# Patient Record
Sex: FEMALE | Race: WHITE | Employment: FULL TIME | URBAN - METROPOLITAN AREA
[De-identification: names, ages, dates, MRNs, and addresses within clinical notes are randomized per-mention and may not be internally consistent; named-entity substitution may affect disease eponyms.]

---

## 2018-07-22 ENCOUNTER — HOSPITAL ENCOUNTER (EMERGENCY)
Age: 33
Discharge: HOME | End: 2018-07-22
Payer: COMMERCIAL

## 2018-07-22 VITALS
RESPIRATION RATE: 16 BRPM | DIASTOLIC BLOOD PRESSURE: 83 MMHG | WEIGHT: 115 LBS | OXYGEN SATURATION: 99 % | TEMPERATURE: 97.5 F | BODY MASS INDEX: 21.71 KG/M2 | HEART RATE: 62 BPM | HEIGHT: 61 IN | SYSTOLIC BLOOD PRESSURE: 125 MMHG

## 2018-07-22 DIAGNOSIS — K02.9 PAIN DUE TO DENTAL CARIES: Primary | ICD-10-CM

## 2018-07-22 PROCEDURE — 99282 EMERGENCY DEPT VISIT SF MDM: CPT

## 2018-07-22 RX ORDER — PENICILLIN V POTASSIUM 500 MG/1
500 TABLET ORAL 4 TIMES DAILY
Qty: 40 TABLET | Refills: 0 | Status: SHIPPED | OUTPATIENT
Start: 2018-07-22 | End: 2018-08-01

## 2018-07-22 RX ORDER — BUPIVACAINE HYDROCHLORIDE 2.5 MG/ML
30 INJECTION, SOLUTION EPIDURAL; INFILTRATION; INTRACAUDAL ONCE
Status: DISCONTINUED | OUTPATIENT
Start: 2018-07-22 | End: 2018-07-22 | Stop reason: HOSPADM

## 2018-07-22 RX ORDER — NAPROXEN 500 MG/1
500 TABLET ORAL 2 TIMES DAILY
Qty: 14 TABLET | Refills: 0 | Status: SHIPPED | OUTPATIENT
Start: 2018-07-22 | End: 2019-10-16

## 2018-07-22 ASSESSMENT — PAIN DESCRIPTION - PAIN TYPE: TYPE: ACUTE PAIN

## 2018-07-22 ASSESSMENT — PAIN DESCRIPTION - DESCRIPTORS: DESCRIPTORS: BURNING;THROBBING

## 2018-07-22 ASSESSMENT — PAIN DESCRIPTION - LOCATION: LOCATION: MOUTH

## 2018-07-22 ASSESSMENT — PAIN DESCRIPTION - FREQUENCY: FREQUENCY: CONTINUOUS

## 2018-07-22 ASSESSMENT — PAIN DESCRIPTION - PROGRESSION: CLINICAL_PROGRESSION: GRADUALLY WORSENING

## 2018-07-22 ASSESSMENT — PAIN DESCRIPTION - ONSET: ONSET: ON-GOING

## 2018-07-22 ASSESSMENT — PAIN DESCRIPTION - ORIENTATION: ORIENTATION: RIGHT

## 2018-07-22 ASSESSMENT — PAIN SCALES - GENERAL: PAINLEVEL_OUTOF10: 9

## 2018-07-22 NOTE — ED PROVIDER NOTES
[facility]       Department of Emergency Medicine   ED  Provider Note  Admit Date/RoomTime: 7/22/2018  7:10 PM  ED Room: 18/18   Chief Complaint   Dental Pain (Rt side; recent root canal; increased pain/swelling; denies drainage)    History of Present Illness   Source of history provided by:  patient. History/Exam Limitations: none. Ivonne Rich is a 29 y.o. old female who has a past medical history of: History reviewed. No pertinent past medical history. presents to the emergency department by private vehicle, for right upper dental pain, which occured 1 day(s) prior to arrival.  Since onset the symptoms have been gradually worsening and severe to profound in severity. Worsened by  heat, cold and chewing and improved by nothing. Associated Signs & Symptoms:  Facial pain/swelling. Onset:       Spontaneous:   no.     Following Trauma:   no.     Previous Caries:   yes. Recent Dental Procedure:   yes: root canal 3/2018. ROS    Pertinent positives and negatives are stated within HPI, all other systems reviewed and are negative. .    Past Surgical History:  has no past surgical history on file. Social History:  reports that she has been smoking. She has never used smokeless tobacco. She reports that she drinks alcohol. She reports that she does not use drugs. Family History: family history is not on file. Allergies: Sulfa antibiotics    Physical Exam           ED Triage Vitals [07/22/18 1917]   BP Temp Temp Source Pulse Resp SpO2 Height Weight   125/83 97.5 °F (36.4 °C) Oral 62 16 99 % 5' 1\" (1.549 m) 115 lb (52.2 kg)      Oxygen Saturation Interpretation: Normal.    · Constitutional:  Alert, development consistent with age. · HEENT:  NC/NT. Airway patent. · Neck:  Supple. Normal ROM. · Lips:  upper and lower normal.  · Mouth:  normal tongue and buccal mucosa. · Dental:  Numerous dental caries and tooth avulsions. Tooth #2 is absent.   Tooth #3 and 4 are avulsed, tooth #5 is

## 2019-10-16 ENCOUNTER — OFFICE VISIT (OUTPATIENT)
Dept: FAMILY MEDICINE CLINIC | Age: 34
End: 2019-10-16
Payer: COMMERCIAL

## 2019-10-16 VITALS
WEIGHT: 139.6 LBS | DIASTOLIC BLOOD PRESSURE: 60 MMHG | BODY MASS INDEX: 26.36 KG/M2 | TEMPERATURE: 97.8 F | OXYGEN SATURATION: 99 % | HEIGHT: 61 IN | HEART RATE: 66 BPM | RESPIRATION RATE: 18 BRPM | SYSTOLIC BLOOD PRESSURE: 110 MMHG

## 2019-10-16 DIAGNOSIS — Z76.89 ENCOUNTER TO ESTABLISH CARE: Primary | ICD-10-CM

## 2019-10-16 DIAGNOSIS — Z83.438 FH: HYPERLIPIDEMIA: ICD-10-CM

## 2019-10-16 DIAGNOSIS — Z82.49 FH: HEART DISEASE: ICD-10-CM

## 2019-10-16 DIAGNOSIS — R63.5 WEIGHT GAIN: ICD-10-CM

## 2019-10-16 DIAGNOSIS — Z23 NEEDS FLU SHOT: ICD-10-CM

## 2019-10-16 PROCEDURE — 99203 OFFICE O/P NEW LOW 30 MIN: CPT | Performed by: NURSE PRACTITIONER

## 2019-10-16 PROCEDURE — 90471 IMMUNIZATION ADMIN: CPT | Performed by: NURSE PRACTITIONER

## 2019-10-16 PROCEDURE — 90686 IIV4 VACC NO PRSV 0.5 ML IM: CPT | Performed by: NURSE PRACTITIONER

## 2019-10-16 RX ORDER — DESOGESTREL AND ETHINYL ESTRADIOL 0.15-0.03
KIT ORAL
COMMUNITY
Start: 2019-10-10 | End: 2021-01-07 | Stop reason: ALTCHOICE

## 2019-10-16 ASSESSMENT — ENCOUNTER SYMPTOMS
VOMITING: 0
TROUBLE SWALLOWING: 0
BACK PAIN: 1
COLOR CHANGE: 0
SINUS PAIN: 0
WHEEZING: 0
SHORTNESS OF BREATH: 0
DIARRHEA: 0
ABDOMINAL PAIN: 0
CHEST TIGHTNESS: 0
NAUSEA: 0
VOICE CHANGE: 0
FACIAL SWELLING: 0
CONSTIPATION: 0
SINUS PRESSURE: 0
RHINORRHEA: 0
SORE THROAT: 0
COUGH: 0

## 2019-10-16 ASSESSMENT — PATIENT HEALTH QUESTIONNAIRE - PHQ9
SUM OF ALL RESPONSES TO PHQ QUESTIONS 1-9: 0
2. FEELING DOWN, DEPRESSED OR HOPELESS: 0
1. LITTLE INTEREST OR PLEASURE IN DOING THINGS: 0
SUM OF ALL RESPONSES TO PHQ QUESTIONS 1-9: 0
SUM OF ALL RESPONSES TO PHQ9 QUESTIONS 1 & 2: 0

## 2019-11-08 ENCOUNTER — HOSPITAL ENCOUNTER (OUTPATIENT)
Age: 34
Discharge: HOME | End: 2019-11-10
Payer: COMMERCIAL

## 2019-11-08 DIAGNOSIS — Z82.49 FH: HEART DISEASE: ICD-10-CM

## 2019-11-08 DIAGNOSIS — Z76.89 ENCOUNTER TO ESTABLISH CARE: ICD-10-CM

## 2019-11-08 DIAGNOSIS — R63.5 WEIGHT GAIN: ICD-10-CM

## 2019-11-08 DIAGNOSIS — Z83.438 FH: HYPERLIPIDEMIA: ICD-10-CM

## 2019-11-08 LAB
ALBUMIN SERPL-MCNC: 4.7 G/DL (ref 3.5–5.2)
ALP BLD-CCNC: 70 U/L (ref 35–104)
ALT SERPL-CCNC: 13 U/L (ref 0–32)
ANION GAP SERPL CALCULATED.3IONS-SCNC: 14 MMOL/L (ref 7–16)
AST SERPL-CCNC: 19 U/L (ref 0–31)
BASOPHILS ABSOLUTE: 0.03 E9/L (ref 0–0.2)
BASOPHILS RELATIVE PERCENT: 0.5 % (ref 0–2)
BILIRUB SERPL-MCNC: 0.7 MG/DL (ref 0–1.2)
BUN BLDV-MCNC: 16 MG/DL (ref 6–20)
CALCIUM SERPL-MCNC: 9.4 MG/DL (ref 8.6–10.2)
CHLORIDE BLD-SCNC: 103 MMOL/L (ref 98–107)
CHOLESTEROL, TOTAL: 188 MG/DL (ref 0–199)
CO2: 22 MMOL/L (ref 22–29)
CREAT SERPL-MCNC: 0.8 MG/DL (ref 0.5–1)
EOSINOPHILS ABSOLUTE: 0.1 E9/L (ref 0.05–0.5)
EOSINOPHILS RELATIVE PERCENT: 1.7 % (ref 0–6)
GFR AFRICAN AMERICAN: >60
GFR NON-AFRICAN AMERICAN: >60 ML/MIN/1.73
GLUCOSE BLD-MCNC: 100 MG/DL (ref 74–99)
HCT VFR BLD CALC: 42.7 % (ref 34–48)
HDLC SERPL-MCNC: 66 MG/DL
HEMOGLOBIN: 13.7 G/DL (ref 11.5–15.5)
IMMATURE GRANULOCYTES #: 0.02 E9/L
IMMATURE GRANULOCYTES %: 0.3 % (ref 0–5)
LDL CHOLESTEROL CALCULATED: 113 MG/DL (ref 0–99)
LYMPHOCYTES ABSOLUTE: 1.78 E9/L (ref 1.5–4)
LYMPHOCYTES RELATIVE PERCENT: 29.5 % (ref 20–42)
MCH RBC QN AUTO: 29.9 PG (ref 26–35)
MCHC RBC AUTO-ENTMCNC: 32.1 % (ref 32–34.5)
MCV RBC AUTO: 93.2 FL (ref 80–99.9)
MONOCYTES ABSOLUTE: 0.4 E9/L (ref 0.1–0.95)
MONOCYTES RELATIVE PERCENT: 6.6 % (ref 2–12)
NEUTROPHILS ABSOLUTE: 3.7 E9/L (ref 1.8–7.3)
NEUTROPHILS RELATIVE PERCENT: 61.4 % (ref 43–80)
PDW BLD-RTO: 12.2 FL (ref 11.5–15)
PLATELET # BLD: 274 E9/L (ref 130–450)
PMV BLD AUTO: 9.9 FL (ref 7–12)
POTASSIUM SERPL-SCNC: 4.5 MMOL/L (ref 3.5–5)
RBC # BLD: 4.58 E12/L (ref 3.5–5.5)
SODIUM BLD-SCNC: 139 MMOL/L (ref 132–146)
T4 FREE: 1.48 NG/DL (ref 0.93–1.7)
TOTAL PROTEIN: 7.5 G/DL (ref 6.4–8.3)
TRIGL SERPL-MCNC: 45 MG/DL (ref 0–149)
TSH SERPL DL<=0.05 MIU/L-ACNC: 0.69 UIU/ML (ref 0.27–4.2)
VLDLC SERPL CALC-MCNC: 9 MG/DL
WBC # BLD: 6 E9/L (ref 4.5–11.5)

## 2019-11-08 PROCEDURE — 36415 COLL VENOUS BLD VENIPUNCTURE: CPT

## 2019-11-08 PROCEDURE — 85025 COMPLETE CBC W/AUTO DIFF WBC: CPT

## 2019-11-08 PROCEDURE — 84439 ASSAY OF FREE THYROXINE: CPT

## 2019-11-08 PROCEDURE — 80053 COMPREHEN METABOLIC PANEL: CPT

## 2019-11-08 PROCEDURE — 84443 ASSAY THYROID STIM HORMONE: CPT

## 2019-11-08 PROCEDURE — 80061 LIPID PANEL: CPT

## 2019-11-15 ENCOUNTER — OFFICE VISIT (OUTPATIENT)
Dept: FAMILY MEDICINE CLINIC | Age: 34
End: 2019-11-15
Payer: COMMERCIAL

## 2019-11-15 VITALS
HEIGHT: 61 IN | DIASTOLIC BLOOD PRESSURE: 72 MMHG | SYSTOLIC BLOOD PRESSURE: 110 MMHG | HEART RATE: 58 BPM | WEIGHT: 134.7 LBS | TEMPERATURE: 97.8 F | BODY MASS INDEX: 25.43 KG/M2 | RESPIRATION RATE: 18 BRPM | OXYGEN SATURATION: 99 %

## 2019-11-15 DIAGNOSIS — R63.5 WEIGHT GAIN: Primary | ICD-10-CM

## 2019-11-15 DIAGNOSIS — R73.01 IMPAIRED FASTING BLOOD SUGAR: ICD-10-CM

## 2019-11-15 LAB — HBA1C MFR BLD: 5 %

## 2019-11-15 PROCEDURE — 83036 HEMOGLOBIN GLYCOSYLATED A1C: CPT | Performed by: NURSE PRACTITIONER

## 2019-11-15 PROCEDURE — 99213 OFFICE O/P EST LOW 20 MIN: CPT | Performed by: NURSE PRACTITIONER

## 2019-11-15 ASSESSMENT — ENCOUNTER SYMPTOMS
COLOR CHANGE: 0
WHEEZING: 0
SHORTNESS OF BREATH: 0
COUGH: 0

## 2021-01-07 ENCOUNTER — HOSPITAL ENCOUNTER (EMERGENCY)
Age: 36
Discharge: HOME | End: 2021-01-07
Attending: EMERGENCY MEDICINE
Payer: COMMERCIAL

## 2021-01-07 ENCOUNTER — APPOINTMENT (OUTPATIENT)
Dept: GENERAL RADIOLOGY | Age: 36
End: 2021-01-07
Payer: COMMERCIAL

## 2021-01-07 VITALS
BODY MASS INDEX: 23 KG/M2 | OXYGEN SATURATION: 98 % | HEART RATE: 77 BPM | WEIGHT: 125 LBS | HEIGHT: 62 IN | TEMPERATURE: 98.3 F | RESPIRATION RATE: 16 BRPM | DIASTOLIC BLOOD PRESSURE: 67 MMHG | SYSTOLIC BLOOD PRESSURE: 130 MMHG

## 2021-01-07 DIAGNOSIS — S63.611A SPRAIN OF LEFT INDEX FINGER, INITIAL ENCOUNTER: Primary | ICD-10-CM

## 2021-01-07 PROCEDURE — 73130 X-RAY EXAM OF HAND: CPT

## 2021-01-07 PROCEDURE — 99283 EMERGENCY DEPT VISIT LOW MDM: CPT

## 2021-01-07 RX ORDER — NORGESTIMATE AND ETHINYL ESTRADIOL 7DAYSX3 LO
1 KIT ORAL NIGHTLY
COMMUNITY

## 2021-01-07 RX ORDER — IBUPROFEN 200 MG
200 TABLET ORAL EVERY 6 HOURS PRN
COMMUNITY

## 2021-01-07 ASSESSMENT — PAIN DESCRIPTION - LOCATION: LOCATION: FINGER (COMMENT WHICH ONE)

## 2021-01-07 ASSESSMENT — ENCOUNTER SYMPTOMS
COLOR CHANGE: 0
NAUSEA: 0
BLOOD IN STOOL: 0
SHORTNESS OF BREATH: 0
ABDOMINAL PAIN: 0
COUGH: 0
VOMITING: 0
BACK PAIN: 0
RHINORRHEA: 0

## 2021-01-07 ASSESSMENT — PAIN DESCRIPTION - PAIN TYPE: TYPE: ACUTE PAIN

## 2021-01-07 ASSESSMENT — PAIN DESCRIPTION - DESCRIPTORS: DESCRIPTORS: ACHING

## 2021-01-07 ASSESSMENT — PAIN DESCRIPTION - FREQUENCY: FREQUENCY: INTERMITTENT

## 2021-01-07 NOTE — ED PROVIDER NOTES
ED PROVIDER NOTE    Chief Complaint   Patient presents with    Finger Pain     left index finger, fall 2 weeks ago, thinks finger is broken       HPI:  1/7/21,   Time: 9:45 AM DONAVAN Saldana is a 37 y.o. female presenting to the ED for left index finger pain. Onset 12/21 after falling in the garage. No other pain elsewhere. Pain is been constant, throbbing, nonradiating, worse with movement at the PIP joint, no associated numbness or weakness. Has been using a finger splint and ibuprofen, however pain has not gotten any better and she needs to use her hands to work as a . Review of Systems:     Review of Systems   Constitutional: Negative for appetite change, chills and fever. HENT: Negative for congestion and rhinorrhea. Eyes: Negative for visual disturbance. Respiratory: Negative for cough and shortness of breath. Cardiovascular: Negative for chest pain. Gastrointestinal: Negative for abdominal pain, blood in stool, nausea and vomiting. Genitourinary: Negative for decreased urine volume and difficulty urinating. Musculoskeletal: Positive for arthralgias. Negative for back pain and neck pain. Skin: Negative for color change. Neurological: Negative for dizziness, syncope, weakness, light-headedness, numbness and headaches.         --------------------------------------------- PAST HISTORY ---------------------------------------------  Past Medical History:   Past Medical History:   Diagnosis Date    Chronic back pain     Headache        Past Surgical History:   History reviewed. No pertinent surgical history.     Social History:   Social History     Socioeconomic History    Marital status: Single     Spouse name: None    Number of children: None    Years of education: None    Highest education level: None   Occupational History    None   Social Needs    Financial resource strain: None    Food insecurity     Worry: None     Inability: None    Transportation needs     Medical: None     Non-medical: None   Tobacco Use    Smoking status: Former Smoker     Years: 13.00     Start date: 2003     Quit date: 10/16/2015     Years since quittin.2    Smokeless tobacco: Never Used    Tobacco comment: e-cigs   Substance and Sexual Activity    Alcohol use: Not Currently    Drug use: No    Sexual activity: Not Currently     Partners: Male     Birth control/protection: Pill   Lifestyle    Physical activity     Days per week: None     Minutes per session: None    Stress: None   Relationships    Social connections     Talks on phone: None     Gets together: None     Attends Christianity service: None     Active member of club or organization: None     Attends meetings of clubs or organizations: None     Relationship status: None    Intimate partner violence     Fear of current or ex partner: None     Emotionally abused: None     Physically abused: None     Forced sexual activity: None   Other Topics Concern    None   Social History Narrative    None       Family History:   Family History   Problem Relation Age of Onset    Cancer Mother         kidney cancer    High Blood Pressure Mother     High Cholesterol Mother     Cancer Father         bladder, skin, lung and brain    High Cholesterol Father     Heart Disease Maternal Grandmother     Emphysema Maternal Grandfather     Heart Disease Maternal Grandfather     Heart Disease Paternal Grandmother     Heart Disease Paternal Grandfather     No Known Problems Half-Sister     No Known Problems Half-Sister     No Known Problems Half-Brother        The patients home medications have been reviewed. Allergies:    Allergies   Allergen Reactions    Sulfa Antibiotics Rash           ---------------------------------------------------PHYSICAL EXAM--------------------------------------    /67   Pulse 77   Temp 98.3 °F (36.8 °C) (Oral)   Resp 16   Ht 5' 2\" (1.575 m)   Wt 125 lb (56.7 kg)   SpO2 98%   BMI 22.86 kg/m²     Physical Exam  Constitutional:       General: She is not in acute distress. Appearance: She is not toxic-appearing. HENT:      Mouth/Throat:      Mouth: Mucous membranes are moist.   Eyes:      General: No scleral icterus. Extraocular Movements: Extraocular movements intact. Neck:      Musculoskeletal: Normal range of motion and neck supple. Cardiovascular:      Rate and Rhythm: Normal rate and regular rhythm. Pulses: Normal pulses. Pulmonary:      Effort: Pulmonary effort is normal. No respiratory distress. Abdominal:      General: There is no distension. Palpations: Abdomen is soft. Tenderness: There is abdominal tenderness. Musculoskeletal:      Comments: L index PIP ttp, pain w/ ROM. MCP/DIP ROM intact and nontender. Good cap refill. SILT throughout   Skin:     General: Skin is warm and dry. Neurological:      Mental Status: She is alert and oriented to person, place, and time. Comments: Moves all extremities with appropriate strength. Sensation grossly intact in all extremities. -------------------------------------------------- RESULTS -------------------------------------------------  I have personally reviewed all laboratory and imaging results for this patient. Results are listed below. RADIOLOGY:  Interpreted personally and by Radiologist.  XR HAND LEFT (MIN 3 VIEWS)   Final Result   Soft tissue swelling at the index finger. No significant osseous abnormality.            ------------------------- NURSING NOTES AND VITALS REVIEWED ---------------------------   The nursing notes within the ED encounter and vital signs as below have been reviewed by myself. /67   Pulse 77   Temp 98.3 °F (36.8 °C) (Oral)   Resp 16   Ht 5' 2\" (1.575 m)   Wt 125 lb (56.7 kg)   SpO2 98%   BMI 22.86 kg/m²   Oxygen Saturation Interpretation: Normal    The patients available past medical records and past encounters were reviewed. ------------------------------ ED COURSE/MEDICAL DECISION MAKING----------------------  Medications - No data to display    Counseling: The emergency provider has spoken with the patient and discussed todays results, in addition to providing specific details for the plan of care and counseling regarding the diagnosis and prognosis. Questions are answered at this time and they are agreeable with the plan. ED Course/Medical Decision Makin y.o. female here with left index finger pain after injury more than 2 weeks ago. Finger is neurovascularly intact, extensor and flexor tendons intact, good cap refill. Plain films negative for acute fracture/dislocation. Updated patient with findings, she agrees with plan for discharge home. Referred to hand surgery given prolonged duration of symptoms without findings on plain films.       --------------------------------- IMPRESSION AND DISPOSITION ---------------------------------    IMPRESSION  1. Sprain of left index finger, initial encounter        DISPOSITION  Disposition: Discharge to home  Patient condition is good    NOTE: This report was transcribed using voice recognition software.  Every effort was made to ensure accuracy; however, inadvertent computerized transcription errors may be present    Anson Ward MD  Attending Emergency Physician          Anson Ward MD  [phone]

## 2022-01-09 ENCOUNTER — HOSPITAL ENCOUNTER (EMERGENCY)
Age: 37
Discharge: HOME | End: 2022-01-09
Payer: COMMERCIAL

## 2022-01-09 VITALS
TEMPERATURE: 98.1 F | OXYGEN SATURATION: 99 % | HEART RATE: 89 BPM | SYSTOLIC BLOOD PRESSURE: 111 MMHG | DIASTOLIC BLOOD PRESSURE: 76 MMHG | RESPIRATION RATE: 16 BRPM | BODY MASS INDEX: 21.03 KG/M2 | WEIGHT: 115 LBS

## 2022-01-09 DIAGNOSIS — N30.01 ACUTE CYSTITIS WITH HEMATURIA: Primary | ICD-10-CM

## 2022-01-09 LAB
BACTERIA: ABNORMAL /HPF
BILIRUBIN URINE: NEGATIVE
BLOOD, URINE: ABNORMAL
CLARITY: ABNORMAL
COLOR: YELLOW
EPITHELIAL CELLS, UA: ABNORMAL /HPF
GLUCOSE URINE: NEGATIVE MG/DL
HCG(URINE) PREGNANCY TEST: NEGATIVE
KETONES, URINE: NEGATIVE MG/DL
LEUKOCYTE ESTERASE, URINE: ABNORMAL
NITRITE, URINE: NEGATIVE
PH UA: 6 (ref 5–9)
PROTEIN UA: 30 MG/DL
RBC UA: >20 /HPF (ref 0–2)
SPECIFIC GRAVITY UA: 1.02 (ref 1–1.03)
UROBILINOGEN, URINE: 0.2 E.U./DL
WBC UA: ABNORMAL /HPF (ref 0–5)

## 2022-01-09 PROCEDURE — 87186 SC STD MICRODIL/AGAR DIL: CPT

## 2022-01-09 PROCEDURE — 81025 URINE PREGNANCY TEST: CPT

## 2022-01-09 PROCEDURE — 87077 CULTURE AEROBIC IDENTIFY: CPT

## 2022-01-09 PROCEDURE — 81001 URINALYSIS AUTO W/SCOPE: CPT

## 2022-01-09 PROCEDURE — 87088 URINE BACTERIA CULTURE: CPT

## 2022-01-09 PROCEDURE — 99211 OFF/OP EST MAY X REQ PHY/QHP: CPT

## 2022-01-09 RX ORDER — PHENAZOPYRIDINE HYDROCHLORIDE 200 MG/1
200 TABLET, FILM COATED ORAL 3 TIMES DAILY PRN
Qty: 6 TABLET | Refills: 0 | Status: SHIPPED | OUTPATIENT
Start: 2022-01-09 | End: 2022-01-11

## 2022-01-09 RX ORDER — CEFDINIR 300 MG/1
300 CAPSULE ORAL 2 TIMES DAILY
Qty: 14 CAPSULE | Refills: 0 | Status: SHIPPED | OUTPATIENT
Start: 2022-01-09 | End: 2022-01-16

## 2022-01-09 NOTE — ED PROVIDER NOTES
[id number] [facility]  Department of Emergency Medicine  UC Encounter Note  22   10:55 AM EST      NAME: Salvatore Solorzano  :  1985  MRN:  [id number]    Chief Complaint: Urinary Tract Infection (flank pain, frequency, not feeling like she is emptying, low back aches, fever)      This is a 68-year-old female the presents to urgent care complaining of urinary symptoms for the past couple days. She denies any fevers or chills. On first contact patient she appears to be in no acute distress. Review of Systems  Pertinent positives and negatives are stated within HPI, all other systems reviewed and are negative. Physical Exam  Vitals and nursing note reviewed. Constitutional:       Appearance: She is well-developed. HENT:      Head: Normocephalic and atraumatic. Right Ear: Hearing and external ear normal.      Left Ear: Hearing and external ear normal.      Nose: Nose normal.      Mouth/Throat:      Pharynx: Uvula midline. Eyes:      General: Lids are normal.      Conjunctiva/sclera: Conjunctivae normal.      Pupils: Pupils are equal, round, and reactive to light. Cardiovascular:      Rate and Rhythm: Normal rate and regular rhythm. Heart sounds: Normal heart sounds. No murmur heard. Pulmonary:      Effort: Pulmonary effort is normal.      Breath sounds: Normal breath sounds. Abdominal:      General: Bowel sounds are normal.      Palpations: Abdomen is soft. Abdomen is not rigid. Tenderness: There is no abdominal tenderness. There is no right CVA tenderness, left CVA tenderness, guarding or rebound. Musculoskeletal:      Cervical back: Normal range of motion and neck supple. Skin:     General: Skin is warm and dry. Findings: No abrasion or rash. Neurological:      Mental Status: She is alert and oriented to person, place, and time. GCS: GCS eye subscore is 4. GCS verbal subscore is 5. GCS motor subscore is 6. Cranial Nerves:  No cranial nerve deficit. Sensory: No sensory deficit. Coordination: Coordination normal.      Gait: Gait normal.         Procedures    [facility]         --------------------------------------------- PAST HISTORY ---------------------------------------------  Past Medical History:  has a past medical history of Chronic back pain and Headache. Past Surgical History:  has no past surgical history on file. Social History:  reports that she quit smoking about 6 years ago. She started smoking about 19 years ago. She quit after 13.00 years of use. She has never used smokeless tobacco. She reports previous alcohol use. She reports that she does not use drugs. Family History: family history includes Cancer in her father and mother; Emphysema in her maternal grandfather; Heart Disease in her maternal grandfather, maternal grandmother, paternal grandfather, and paternal grandmother; High Blood Pressure in her mother; High Cholesterol in her father and mother; No Known Problems in her half-brother, half-sister, and half-sister. The patients home medications have been reviewed.     Allergies: Sulfa antibiotics    -------------------------------------------------- RESULTS -------------------------------------------------  Results for orders placed or performed during the hospital encounter of 01/09/22   Urinalysis   Result Value Ref Range    Color, UA Yellow Straw/Yellow    Clarity, UA CLOUDY (A) Clear    Glucose, Ur Negative Negative mg/dL    Bilirubin Urine Negative Negative    Ketones, Urine Negative Negative mg/dL    Specific Gravity, UA 1.020 1.005 - 1.030    Blood, Urine MODERATE (A) Negative    pH, UA 6.0 5.0 - 9.0    Protein, UA 30 (A) Negative mg/dL    Urobilinogen, Urine 0.2 <2.0 E.U./dL    Nitrite, Urine Negative Negative    Leukocyte Esterase, Urine MODERATE (A) Negative   Pregnancy, Urine   Result Value Ref Range    HCG(Urine) Pregnancy Test NEGATIVE NEGATIVE   Microscopic Urinalysis   Result Value Ref Range    WBC, UA PACKED (A) 0 - 5 /HPF    RBC, UA >20 0 - 2 /HPF    Epithelial Cells, UA FEW /HPF    Bacteria, UA FEW (A) None Seen /HPF     No orders to display       ------------------------- NURSING NOTES AND VITALS REVIEWED ---------------------------   The nursing notes within the ED encounter and vital signs as below have been reviewed. /76   Pulse 89   Temp 98.1 °F (36.7 °C)   Resp 16   Wt 115 lb (52.2 kg)   LMP 12/27/2021   SpO2 99%   BMI 21.03 kg/m²   Oxygen Saturation Interpretation: Normal      ------------------------------------------ PROGRESS NOTES ------------------------------------------   I have spoken with the patient and discussed todays results, in addition to providing specific details for the plan of care and counseling regarding the diagnosis and prognosis. Their questions are answered at this time and they are agreeable with the plan.      --------------------------------- ADDITIONAL PROVIDER NOTES ---------------------------------     This patient is stable for discharge. I have shared the specific conditions for return, as well as the importance of follow-up. * NOTE: This report was transcribed using voice recognition software. Every effort was made to ensure accuracy; however, inadvertent computerized transcription errors may be present.    --------------------------------- IMPRESSION AND DISPOSITION ---------------------------------    IMPRESSION  1.  Acute cystitis with hematuria        DISPOSITION  Disposition: Discharge to home  Patient condition is good       Lopez Corrigan PA-C  01/09/22 [id number]

## 2022-01-10 ENCOUNTER — APPOINTMENT (OUTPATIENT)
Dept: CT IMAGING | Age: 37
End: 2022-01-10
Payer: COMMERCIAL

## 2022-01-10 ENCOUNTER — HOSPITAL ENCOUNTER (EMERGENCY)
Age: 37
Discharge: HOME | End: 2022-01-10
Attending: STUDENT IN AN ORGANIZED HEALTH CARE EDUCATION/TRAINING PROGRAM
Payer: COMMERCIAL

## 2022-01-10 ENCOUNTER — TELEPHONE (OUTPATIENT)
Dept: FAMILY MEDICINE CLINIC | Age: 37
End: 2022-01-10

## 2022-01-10 VITALS
HEART RATE: 84 BPM | RESPIRATION RATE: 16 BRPM | DIASTOLIC BLOOD PRESSURE: 63 MMHG | SYSTOLIC BLOOD PRESSURE: 139 MMHG | OXYGEN SATURATION: 98 % | TEMPERATURE: 98.1 F

## 2022-01-10 DIAGNOSIS — N10 ACUTE PYELONEPHRITIS: Primary | ICD-10-CM

## 2022-01-10 LAB
ANION GAP SERPL CALCULATED.3IONS-SCNC: 12 MMOL/L (ref 7–16)
BACTERIA: ABNORMAL /HPF
BASOPHILS ABSOLUTE: 0.04 E9/L (ref 0–0.2)
BASOPHILS RELATIVE PERCENT: 0.3 % (ref 0–2)
BILIRUBIN URINE: NEGATIVE
BLOOD, URINE: ABNORMAL
BUN BLDV-MCNC: 10 MG/DL (ref 6–20)
CALCIUM SERPL-MCNC: 9.2 MG/DL (ref 8.6–10.2)
CHLORIDE BLD-SCNC: 101 MMOL/L (ref 98–107)
CLARITY: ABNORMAL
CO2: 24 MMOL/L (ref 22–29)
COLOR: ABNORMAL
CREAT SERPL-MCNC: 0.7 MG/DL (ref 0.5–1)
EOSINOPHILS ABSOLUTE: 0.02 E9/L (ref 0.05–0.5)
EOSINOPHILS RELATIVE PERCENT: 0.1 % (ref 0–6)
EPITHELIAL CELLS, UA: ABNORMAL /HPF
GFR AFRICAN AMERICAN: >60
GFR NON-AFRICAN AMERICAN: >60 ML/MIN/1.73
GLUCOSE BLD-MCNC: 98 MG/DL (ref 74–99)
GLUCOSE URINE: 100 MG/DL
HCT VFR BLD CALC: 41.1 % (ref 34–48)
HEMOGLOBIN: 13.3 G/DL (ref 11.5–15.5)
IMMATURE GRANULOCYTES #: 0.07 E9/L
IMMATURE GRANULOCYTES %: 0.5 % (ref 0–5)
KETONES, URINE: NEGATIVE MG/DL
LACTIC ACID: 1.5 MMOL/L (ref 0.5–2.2)
LEUKOCYTE ESTERASE, URINE: ABNORMAL
LIPASE: 17 U/L (ref 13–60)
LYMPHOCYTES ABSOLUTE: 1.46 E9/L (ref 1.5–4)
LYMPHOCYTES RELATIVE PERCENT: 10.8 % (ref 20–42)
MCH RBC QN AUTO: 30.4 PG (ref 26–35)
MCHC RBC AUTO-ENTMCNC: 32.4 % (ref 32–34.5)
MCV RBC AUTO: 94.1 FL (ref 80–99.9)
MONOCYTES ABSOLUTE: 0.93 E9/L (ref 0.1–0.95)
MONOCYTES RELATIVE PERCENT: 6.9 % (ref 2–12)
NEUTROPHILS ABSOLUTE: 11.04 E9/L (ref 1.8–7.3)
NEUTROPHILS RELATIVE PERCENT: 81.4 % (ref 43–80)
NITRITE, URINE: POSITIVE
PDW BLD-RTO: 12 FL (ref 11.5–15)
PH UA: 5.5 (ref 5–9)
PLATELET # BLD: 249 E9/L (ref 130–450)
PMV BLD AUTO: 9.3 FL (ref 7–12)
POTASSIUM REFLEX MAGNESIUM: 4.5 MMOL/L (ref 3.5–5)
PROTEIN UA: 100 MG/DL
RBC # BLD: 4.37 E12/L (ref 3.5–5.5)
RBC UA: ABNORMAL /HPF (ref 0–2)
SODIUM BLD-SCNC: 137 MMOL/L (ref 132–146)
SPECIFIC GRAVITY UA: 1.02 (ref 1–1.03)
UROBILINOGEN, URINE: 4 E.U./DL
WBC # BLD: 13.6 E9/L (ref 4.5–11.5)
WBC UA: ABNORMAL /HPF (ref 0–5)

## 2022-01-10 PROCEDURE — 81001 URINALYSIS AUTO W/SCOPE: CPT

## 2022-01-10 PROCEDURE — 36415 COLL VENOUS BLD VENIPUNCTURE: CPT

## 2022-01-10 PROCEDURE — 85025 COMPLETE CBC W/AUTO DIFF WBC: CPT

## 2022-01-10 PROCEDURE — 74176 CT ABD & PELVIS W/O CONTRAST: CPT

## 2022-01-10 PROCEDURE — 99283 EMERGENCY DEPT VISIT LOW MDM: CPT

## 2022-01-10 PROCEDURE — 83605 ASSAY OF LACTIC ACID: CPT

## 2022-01-10 PROCEDURE — 80048 BASIC METABOLIC PNL TOTAL CA: CPT

## 2022-01-10 PROCEDURE — 83690 ASSAY OF LIPASE: CPT

## 2022-01-10 RX ORDER — ONDANSETRON 4 MG/1
4 TABLET, ORALLY DISINTEGRATING ORAL EVERY 8 HOURS PRN
Qty: 10 TABLET | Refills: 0 | Status: SHIPPED | OUTPATIENT
Start: 2022-01-10

## 2022-01-10 RX ORDER — TRAMADOL HYDROCHLORIDE 50 MG/1
50 TABLET ORAL EVERY 6 HOURS PRN
Qty: 8 TABLET | Refills: 0 | Status: SHIPPED | OUTPATIENT
Start: 2022-01-10 | End: 2022-01-12

## 2022-01-10 RX ORDER — CEFDINIR 300 MG/1
300 CAPSULE ORAL 2 TIMES DAILY
Qty: 6 CAPSULE | Refills: 0 | Status: SHIPPED | OUTPATIENT
Start: 2022-01-16 | End: 2022-01-19

## 2022-01-10 ASSESSMENT — PAIN SCALES - GENERAL: PAINLEVEL_OUTOF10: 8

## 2022-01-10 ASSESSMENT — PAIN DESCRIPTION - PAIN TYPE: TYPE: ACUTE PAIN

## 2022-01-10 ASSESSMENT — PAIN DESCRIPTION - LOCATION: LOCATION: BACK

## 2022-01-10 NOTE — Clinical Note
Cindy Foypepe was seen and treated in our emergency department on 1/10/2022. She may return to work on 01/13/2022. If you have any questions or concerns, please don't hesitate to call.       Guadalupe Ortega MD

## 2022-01-10 NOTE — Clinical Note
Rui Schultz was seen and treated in our emergency department on 1/10/2022. She may return to work on 01/13/2022. If you have any questions or concerns, please don't hesitate to call.       Ricardo Burgess MD

## 2022-01-10 NOTE — ED PROVIDER NOTES
Micheal Landau is a 31year old female without significant PMH who presented to ED with concern for dysuria. Patient began to have right sided flank pain with dysuria and worse with ambulation, patient will take 3rd dose of medication tonight, patient did have fevers last night, patient is having pain with ambulation today as she works as a . Patient denies chest pain, shortness of breath, nausea, vomiting, abdominal pain. Nothing makes symptoms better or worse, symptoms moderate in severity and constant. Patient does not use tobacco.     The history is provided by the patient and medical records. Review of Systems   Constitutional: Positive for fever. Negative for chills, diaphoresis and fatigue. Eyes: Negative for photophobia and visual disturbance. Respiratory: Negative for cough, chest tightness and shortness of breath. Cardiovascular: Negative for chest pain, palpitations and leg swelling. Gastrointestinal: Negative for abdominal distention, abdominal pain, diarrhea, nausea and vomiting. Genitourinary: Positive for dysuria and flank pain. Musculoskeletal: Negative for neck pain and neck stiffness. Skin: Negative for pallor and rash. Neurological: Negative for headaches. Psychiatric/Behavioral: Negative for confusion. Physical Exam  Vitals and nursing note reviewed. Constitutional:       General: She is not in acute distress. Appearance: Normal appearance. She is not ill-appearing. HENT:      Head: Normocephalic and atraumatic. Eyes:      General: No scleral icterus. Conjunctiva/sclera: Conjunctivae normal.      Pupils: Pupils are equal, round, and reactive to light. Cardiovascular:      Rate and Rhythm: Normal rate and regular rhythm. Pulmonary:      Effort: Pulmonary effort is normal.      Breath sounds: Normal breath sounds. Abdominal:      General: Bowel sounds are normal. There is no distension. Palpations: Abdomen is soft. Tenderness: There is abdominal tenderness. There is right CVA tenderness. There is no guarding or rebound. Comments: Mild suprapubic tenderness. Musculoskeletal:      Cervical back: Normal range of motion and neck supple. No rigidity or tenderness. No muscular tenderness. Right lower leg: No edema. Left lower leg: No edema. Skin:     General: Skin is warm and dry. Capillary Refill: Capillary refill takes less than 2 seconds. Coloration: Skin is not pale. Findings: No erythema or rash. Neurological:      Mental Status: She is alert and oriented to person, place, and time. Psychiatric:         Mood and Affect: Mood normal.          Procedures     MDM  Number of Diagnoses or Management Options  Acute pyelonephritis  Diagnosis management comments: Rui Schultz is a 31year old female who presented to ED with concern for abdominal pain and flank pain. Patient had preliminary cultures grew e coli, patient was given addition 3 days of omnicef for likely pyelonephritis. Patient given pain medication and nausea medication. Patient is afebrile and not toxic patient will be discharged home on medication for supportive care. Patient advised to return to ED if symptoms worsen             --------------------------------------------- PAST HISTORY ---------------------------------------------  Past Medical History:  has a past medical history of Chronic back pain and Headache. Past Surgical History:  has no past surgical history on file. Social History:  reports that she quit smoking about 6 years ago. She started smoking about 19 years ago. She quit after 13.00 years of use. She has never used smokeless tobacco. She reports previous alcohol use. She reports that she does not use drugs.     Family History: family history includes Cancer in her father and mother; Emphysema in her maternal grandfather; Heart Disease in her maternal grandfather, maternal grandmother, paternal grandfather, and paternal grandmother; High Blood Pressure in her mother; High Cholesterol in her father and mother; No Known Problems in her half-brother, half-sister, and half-sister. The patients home medications have been reviewed.     Allergies: Sulfa antibiotics    -------------------------------------------------- RESULTS -------------------------------------------------  Labs:  Results for orders placed or performed during the hospital encounter of 01/10/22   CBC Auto Differential   Result Value Ref Range    WBC 13.6 (H) 4.5 - 11.5 E9/L    RBC 4.37 3.50 - 5.50 E12/L    Hemoglobin 13.3 11.5 - 15.5 g/dL    Hematocrit 41.1 34.0 - 48.0 %    MCV 94.1 80.0 - 99.9 fL    MCH 30.4 26.0 - 35.0 pg    MCHC 32.4 32.0 - 34.5 %    RDW 12.0 11.5 - 15.0 fL    Platelets 513 089 - 721 E9/L    MPV 9.3 7.0 - 12.0 fL    Neutrophils % 81.4 (H) 43.0 - 80.0 %    Immature Granulocytes % 0.5 0.0 - 5.0 %    Lymphocytes % 10.8 (L) 20.0 - 42.0 %    Monocytes % 6.9 2.0 - 12.0 %    Eosinophils % 0.1 0.0 - 6.0 %    Basophils % 0.3 0.0 - 2.0 %    Neutrophils Absolute 11.04 (H) 1.80 - 7.30 E9/L    Immature Granulocytes # 0.07 E9/L    Lymphocytes Absolute 1.46 (L) 1.50 - 4.00 E9/L    Monocytes Absolute 0.93 0.10 - 0.95 E9/L    Eosinophils Absolute 0.02 (L) 0.05 - 0.50 E9/L    Basophils Absolute 0.04 0.00 - 0.20 I4/W   Basic Metabolic Panel w/ Reflex to MG   Result Value Ref Range    Sodium 137 132 - 146 mmol/L    Potassium reflex Magnesium 4.5 3.5 - 5.0 mmol/L    Chloride 101 98 - 107 mmol/L    CO2 24 22 - 29 mmol/L    Anion Gap 12 7 - 16 mmol/L    Glucose 98 74 - 99 mg/dL    BUN 10 6 - 20 mg/dL    CREATININE 0.7 0.5 - 1.0 mg/dL    GFR Non-African American >60 >=60 mL/min/1.73    GFR African American >60     Calcium 9.2 8.6 - 10.2 mg/dL   Lipase   Result Value Ref Range    Lipase 17 13 - 60 U/L   Urinalysis, reflex to microscopic   Result Value Ref Range    Color, UA ORANGE (A) Straw/Yellow    Clarity, UA SLCLOUDY Clear    Glucose, Ur 100 (A) Negative mg/dL    Bilirubin Urine Negative Negative    Ketones, Urine Negative Negative mg/dL    Specific Gravity, UA 1.020 1.005 - 1.030    Blood, Urine SMALL (A) Negative    pH, UA 5.5 5.0 - 9.0    Protein,  (A) Negative mg/dL    Urobilinogen, Urine 4.0 (A) <2.0 E.U./dL    Nitrite, Urine POSITIVE (A) Negative    Leukocyte Esterase, Urine SMALL (A) Negative   Lactic Acid, Plasma   Result Value Ref Range    Lactic Acid 1.5 0.5 - 2.2 mmol/L   Microscopic Urinalysis   Result Value Ref Range    WBC, UA 10-20 (A) 0 - 5 /HPF    RBC, UA 1-3 0 - 2 /HPF    Epithelial Cells, UA FEW /HPF    Bacteria, UA FEW (A) None Seen /HPF       Radiology:  CT ABDOMEN PELVIS WO CONTRAST Additional Contrast? None   Final Result   1. No acute abnormality is seen in the abdomen or the pelvis. 2. No urolithiasis or hydronephrosis. RECOMMENDATIONS:   Unavailable             ------------------------- NURSING NOTES AND VITALS REVIEWED ---------------------------  Date / Time Roomed:  1/10/2022  5:49 PM  ED Bed Assignment:  [id number]    The nursing notes within the ED encounter and vital signs as below have been reviewed. /63   Pulse 84   Temp 98.1 °F (36.7 °C) (Oral)   Resp 16   LMP 12/27/2021   SpO2 98%   Oxygen Saturation Interpretation: Normal      ------------------------------------------ PROGRESS NOTES ------------------------------------------  3:52 PM EST  I have spoken with the patient and discussed todays results, in addition to providing specific details for the plan of care and counseling regarding the diagnosis and prognosis. Their questions are answered at this time and they are agreeable with the plan. I discussed at length with them reasons for immediate return here for re evaluation. They will followup with their primary care physician by calling their office tomorrow.       --------------------------------- ADDITIONAL PROVIDER NOTES ---------------------------------  At this time the patient is without objective evidence of an acute process requiring hospitalization or inpatient management. They have remained hemodynamically stable throughout their entire ED visit and are stable for discharge with outpatient follow-up. The plan has been discussed in detail and they are aware of the specific conditions for emergent return, as well as the importance of follow-up. Discharge Medication List as of 1/10/2022  6:22 PM      START taking these medications    Details   !! cefdinir (OMNICEF) 300 MG capsule Take 1 capsule by mouth 2 times daily for 3 days, Disp-6 capsule, R-0Print      ondansetron (ZOFRAN ODT) 4 MG disintegrating tablet Take 1 tablet by mouth every 8 hours as needed for Nausea or Vomiting, Disp-10 tablet, R-0Print      traMADol (ULTRAM) 50 MG tablet Take 1 tablet by mouth every 6 hours as needed for Pain for up to 2 days. Intended supply: 3 days. Take lowest dose possible to manage pain, Disp-8 tablet, R-0Print       !! - Potential duplicate medications found. Please discuss with provider. Diagnosis:  1. Acute pyelonephritis        Disposition:  Patient's disposition: Discharge to home  Patient's condition is stable.              Janis Tubbs MD  01/11/22 [id number]

## 2022-01-10 NOTE — TELEPHONE ENCOUNTER
----- Message from Gisela Rizvi sent at 1/10/2022  8:19 AM EST -----  Subject: Appointment Request    Reason for Call: Urgent (Patient Request) ED Follow Up Visit    QUESTIONS  Type of Appointment? Established Patient  Reason for appointment request? No appointments available during search  Additional Information for Provider? requesting to be seen for possible   UTI/kidney infection. Went to urgent care yesterday and is no better. She   is a  and having a hard time walking. before 9:30am   [phone] Asya Arnold   ---------------------------------------------------------------------------  --------------  Leticia Cross INFO  What is the best way for the office to contact you? Do not leave any   message, patient will call back for answer  Preferred Call Back Phone Number? [phone]  ---------------------------------------------------------------------------  --------------  SCRIPT ANSWERS  Relationship to Patient? Parent  Representative Name? mihai gomez  Additional information verified (besides Name and Date of Birth)? Address  (Patient requests to see provider urgently. )? Yes  Do you have any questions for your primary care provider that need to be   answered prior to your appointment? No  Have you been diagnosed with, awaiting test results for, or told that you   are suspected of having COVID-19 (Coronavirus)? (If patient has tested   negative or was tested as a requirement for work, school, or travel and   not based on symptoms, answer no)? No  Within the past two weeks have you developed any of the following symptoms   (answer no if symptoms have been present longer than 2 weeks or began   more than 2 weeks ago)? Fever or Chills, Cough, Shortness of breath or   difficulty breathing, Loss of taste or smell, Sore throat, Nasal   congestion, Sneezing or runny nose, Fatigue or generalized body aches   (answer no if pain is specific to a body part e.g. back pain), Diarrhea,   Headache?  No  Have you had close contact with someone with COVID-19 in the last 14 days? No  (Service Expert - click yes below to proceed with [facility] As Usual   Scheduling)?  Yes

## 2022-01-10 NOTE — TELEPHONE ENCOUNTER
She was given omnicef yesterday? May not work really fast especially if she has had if for a while. Dejon Dockery

## 2022-01-10 NOTE — TELEPHONE ENCOUNTER
Called pt who says for the last 6 days has been having right sided back pain. Pt c/o urgency and had \"matter\" in her urine when she did urinate. Pt c/o decreased urine output and for the last 2 days has been running a fever. Pt says the [facility] told her to go to ER but she didn't want to miss work. Advised pt she needs evaluated in the ER. Pr verbalized understanding and was agreeable.

## 2022-01-10 NOTE — ED TRIAGE NOTES
Department of Emergency Medicine  FIRST PROVIDER TRIAGE NOTE             Independent MLP           1/10/22  1:47 PM EST    Date of Encounter: 1/10/22   MRN: [id number]      HPI: Clarence Crabtree is a 31 y.o. female who presents to the ED for right flank pain and dysuria    ROS: Negative for fever, vomiting or diarrhea. PE: Gen Appearance/Constitutional: alert  GI: soft and NT    Vitals:    01/10/22 1343   BP: 139/63   Pulse: 84   Resp: 16   Temp: 98.1 °F (36.7 °C)   SpO2: 98%       Past medical history, surgical history, and medications reviewed. Initial Plan of Care: All treatment areas within department are currently occupied. Plan to order/initiate the following while awaiting opening in ED: labs and imaging studies. Initiate treatment/testing, proceed to treatment area when bed available for ED Attending/MLP to continue care.     Electronically signed by LIZ Begum CNP   DD: 1/10/22

## 2022-01-11 LAB
ORGANISM: ABNORMAL
URINE CULTURE, ROUTINE: ABNORMAL

## 2022-01-11 ASSESSMENT — ENCOUNTER SYMPTOMS
ABDOMINAL DISTENTION: 0
ABDOMINAL PAIN: 0
DIARRHEA: 0
VOMITING: 0
NAUSEA: 0
PHOTOPHOBIA: 0
COUGH: 0
CHEST TIGHTNESS: 0
SHORTNESS OF BREATH: 0

## 2023-03-10 ENCOUNTER — HOSPITAL ENCOUNTER (OUTPATIENT)
Age: 38
Discharge: HOME | End: 2023-03-10
Payer: COMMERCIAL

## 2023-03-10 LAB
GLUCOSE TOLERANCE SCREEN 50G: 145 MG/DL (ref 70–140)
HCT VFR BLD CALC: 35.6 % (ref 34–48)
HEMOGLOBIN: 12.1 G/DL (ref 11.5–15.5)
MCH RBC QN AUTO: 31.8 PG (ref 26–35)
MCHC RBC AUTO-ENTMCNC: 34 % (ref 32–34.5)
MCV RBC AUTO: 93.7 FL (ref 80–99.9)
PDW BLD-RTO: 12.8 FL (ref 11.5–15)
PLATELET # BLD: 205 E9/L (ref 130–450)
PMV BLD AUTO: 9.6 FL (ref 7–12)
RBC # BLD: 3.8 E12/L (ref 3.5–5.5)
WBC # BLD: 8.1 E9/L (ref 4.5–11.5)

## 2023-03-10 PROCEDURE — 82950 GLUCOSE TEST: CPT

## 2023-03-10 PROCEDURE — 85027 COMPLETE CBC AUTOMATED: CPT

## 2023-03-10 PROCEDURE — 36415 COLL VENOUS BLD VENIPUNCTURE: CPT

## 2023-03-24 ENCOUNTER — HOSPITAL ENCOUNTER (OUTPATIENT)
Age: 38
Discharge: HOME | End: 2023-03-24
Payer: COMMERCIAL

## 2023-03-24 LAB
GLUCOSE FASTING: 92 MG/ML
GLUCOSE TOLERANCE TEST 1 HOUR: 112 MG/DL
GLUCOSE TOLERANCE TEST 2 HOUR: 117 MG/DL
GLUCOSE TOLERANCE TEST 3 HOUR: 74 MG/DL

## 2023-03-24 PROCEDURE — 36415 COLL VENOUS BLD VENIPUNCTURE: CPT

## 2023-03-24 PROCEDURE — 82951 GLUCOSE TOLERANCE TEST (GTT): CPT

## 2023-03-24 PROCEDURE — 82952 GTT-ADDED SAMPLES: CPT

## 2023-06-02 ENCOUNTER — ANESTHESIA EVENT (OUTPATIENT)
Dept: LABOR AND DELIVERY | Age: 38
End: 2023-06-02
Payer: COMMERCIAL

## 2023-06-02 ENCOUNTER — ANESTHESIA (OUTPATIENT)
Dept: LABOR AND DELIVERY | Age: 38
End: 2023-06-02
Payer: COMMERCIAL

## 2023-06-02 ENCOUNTER — APPOINTMENT (OUTPATIENT)
Dept: LABOR AND DELIVERY | Age: 38
End: 2023-06-02
Payer: COMMERCIAL

## 2023-06-02 ENCOUNTER — HOSPITAL ENCOUNTER (INPATIENT)
Age: 38
LOS: 2 days | Discharge: HOME | End: 2023-06-04
Attending: OBSTETRICS & GYNECOLOGY | Admitting: OBSTETRICS & GYNECOLOGY
Payer: COMMERCIAL

## 2023-06-02 PROBLEM — Z3A.39 39 WEEKS GESTATION OF PREGNANCY: Status: ACTIVE | Noted: 2023-06-02

## 2023-06-02 LAB
ABO + RH BLD: NORMAL
ALBUMIN SERPL-MCNC: 3.4 G/DL (ref 3.5–5.2)
ALP SERPL-CCNC: 151 U/L (ref 35–104)
ALT SERPL-CCNC: 19 U/L (ref 0–32)
AMPHET UR QL SCN: NOT DETECTED
ANION GAP SERPL CALCULATED.3IONS-SCNC: 11 MMOL/L (ref 7–16)
AST SERPL-CCNC: 24 U/L (ref 0–31)
BARBITURATES UR QL SCN: NOT DETECTED
BENZODIAZ UR QL SCN: NOT DETECTED
BILIRUB SERPL-MCNC: 0.4 MG/DL (ref 0–1.2)
BLD GP AB SCN SERPL QL: NORMAL
BUN SERPL-MCNC: 9 MG/DL (ref 6–20)
CALCIUM SERPL-MCNC: 8.7 MG/DL (ref 8.6–10.2)
CANNABINOIDS UR QL SCN: NOT DETECTED
CHLORIDE SERPL-SCNC: 106 MMOL/L (ref 98–107)
CO2 SERPL-SCNC: 23 MMOL/L (ref 22–29)
COCAINE UR QL SCN: NOT DETECTED
CREAT SERPL-MCNC: 0.5 MG/DL (ref 0.5–1)
DRUG SCREEN COMMENT UR-IMP: NORMAL
ERYTHROCYTE [DISTWIDTH] IN BLOOD BY AUTOMATED COUNT: 13 FL (ref 11.5–15)
FENTANYL SCREEN, URINE: NOT DETECTED
GLUCOSE SERPL-MCNC: 82 MG/DL (ref 74–99)
HCT VFR BLD AUTO: 37.1 % (ref 34–48)
HGB BLD-MCNC: 12.5 G/DL (ref 11.5–15.5)
MCH RBC QN AUTO: 31.6 PG (ref 26–35)
MCHC RBC AUTO-ENTMCNC: 33.7 % (ref 32–34.5)
MCV RBC AUTO: 93.9 FL (ref 80–99.9)
METHADONE UR QL SCN: NOT DETECTED
OPIATES UR QL SCN: NOT DETECTED
OXYCODONE URINE: NOT DETECTED
PCP UR QL SCN: NOT DETECTED
PLATELET # BLD AUTO: 169 E9/L (ref 130–450)
PMV BLD AUTO: 10.6 FL (ref 7–12)
POTASSIUM SERPL-SCNC: 4.3 MMOL/L (ref 3.5–5)
PROT SERPL-MCNC: 6.2 G/DL (ref 6.4–8.3)
RBC # BLD AUTO: 3.95 E12/L (ref 3.5–5.5)
SODIUM SERPL-SCNC: 140 MMOL/L (ref 132–146)
WBC # BLD: 7.9 E9/L (ref 4.5–11.5)

## 2023-06-02 PROCEDURE — 3E033VJ INTRODUCTION OF OTHER HORMONE INTO PERIPHERAL VEIN, PERCUTANEOUS APPROACH: ICD-10-PCS | Performed by: OBSTETRICS & GYNECOLOGY

## 2023-06-02 PROCEDURE — 1220000001 HC SEMI PRIVATE L&D R&B

## 2023-06-02 PROCEDURE — 2580000003 HC RX 258: Performed by: OBSTETRICS & GYNECOLOGY

## 2023-06-02 PROCEDURE — 2500000003 HC RX 250 WO HCPCS: Performed by: ANESTHESIOLOGY

## 2023-06-02 PROCEDURE — 80053 COMPREHEN METABOLIC PANEL: CPT

## 2023-06-02 PROCEDURE — 7200000001 HC VAGINAL DELIVERY

## 2023-06-02 PROCEDURE — 80307 DRUG TEST PRSMV CHEM ANLYZR: CPT

## 2023-06-02 PROCEDURE — 85027 COMPLETE CBC AUTOMATED: CPT

## 2023-06-02 PROCEDURE — 86900 BLOOD TYPING SEROLOGIC ABO: CPT

## 2023-06-02 PROCEDURE — 36415 COLL VENOUS BLD VENIPUNCTURE: CPT

## 2023-06-02 PROCEDURE — 6360000002 HC RX W HCPCS: Performed by: OBSTETRICS & GYNECOLOGY

## 2023-06-02 PROCEDURE — 86850 RBC ANTIBODY SCREEN: CPT

## 2023-06-02 PROCEDURE — 10907ZC DRAINAGE OF AMNIOTIC FLUID, THERAPEUTIC FROM PRODUCTS OF CONCEPTION, VIA NATURAL OR ARTIFICIAL OPENING: ICD-10-PCS | Performed by: OBSTETRICS & GYNECOLOGY

## 2023-06-02 PROCEDURE — 86901 BLOOD TYPING SEROLOGIC RH(D): CPT

## 2023-06-02 PROCEDURE — 3700000025 EPIDURAL BLOCK: Performed by: ANESTHESIOLOGY

## 2023-06-02 RX ORDER — METHYLERGONOVINE MALEATE 0.2 MG/ML
200 INJECTION INTRAVENOUS PRN
Status: DISCONTINUED | OUTPATIENT
Start: 2023-06-02 | End: 2023-06-03

## 2023-06-02 RX ORDER — NALOXONE HYDROCHLORIDE 0.4 MG/ML
INJECTION, SOLUTION INTRAMUSCULAR; INTRAVENOUS; SUBCUTANEOUS PRN
Status: DISCONTINUED | OUTPATIENT
Start: 2023-06-02 | End: 2023-06-03

## 2023-06-02 RX ORDER — ONDANSETRON 2 MG/ML
4 INJECTION INTRAMUSCULAR; INTRAVENOUS EVERY 6 HOURS PRN
Status: DISCONTINUED | OUTPATIENT
Start: 2023-06-02 | End: 2023-06-03

## 2023-06-02 RX ORDER — FERROUS SULFATE 325(65) MG
325 TABLET ORAL 2 TIMES DAILY WITH MEALS
Status: DISCONTINUED | OUTPATIENT
Start: 2023-06-03 | End: 2023-06-04 | Stop reason: HOSPADM

## 2023-06-02 RX ORDER — SODIUM CHLORIDE, SODIUM LACTATE, POTASSIUM CHLORIDE, CALCIUM CHLORIDE 600; 310; 30; 20 MG/100ML; MG/100ML; MG/100ML; MG/100ML
INJECTION, SOLUTION INTRAVENOUS CONTINUOUS
Status: DISCONTINUED | OUTPATIENT
Start: 2023-06-02 | End: 2023-06-03

## 2023-06-02 RX ORDER — ACETAMINOPHEN 500 MG
1000 TABLET ORAL EVERY 8 HOURS PRN
Status: DISCONTINUED | OUTPATIENT
Start: 2023-06-02 | End: 2023-06-04 | Stop reason: HOSPADM

## 2023-06-02 RX ORDER — SODIUM CHLORIDE 0.9 % (FLUSH) 0.9 %
5-40 SYRINGE (ML) INJECTION EVERY 12 HOURS SCHEDULED
Status: DISCONTINUED | OUTPATIENT
Start: 2023-06-02 | End: 2023-06-03

## 2023-06-02 RX ORDER — ONDANSETRON 4 MG/1
8 TABLET, ORALLY DISINTEGRATING ORAL EVERY 8 HOURS PRN
Status: DISCONTINUED | OUTPATIENT
Start: 2023-06-02 | End: 2023-06-04 | Stop reason: HOSPADM

## 2023-06-02 RX ORDER — MISOPROSTOL 200 UG/1
800 TABLET ORAL PRN
Status: DISCONTINUED | OUTPATIENT
Start: 2023-06-02 | End: 2023-06-03

## 2023-06-02 RX ORDER — CARBOPROST TROMETHAMINE 250 UG/ML
250 INJECTION, SOLUTION INTRAMUSCULAR PRN
Status: DISCONTINUED | OUTPATIENT
Start: 2023-06-02 | End: 2023-06-03

## 2023-06-02 RX ORDER — MODIFIED LANOLIN
OINTMENT (GRAM) TOPICAL PRN
Status: DISCONTINUED | OUTPATIENT
Start: 2023-06-02 | End: 2023-06-04 | Stop reason: HOSPADM

## 2023-06-02 RX ORDER — SODIUM CHLORIDE 9 MG/ML
INJECTION, SOLUTION INTRAVENOUS PRN
Status: DISCONTINUED | OUTPATIENT
Start: 2023-06-02 | End: 2023-06-04 | Stop reason: HOSPADM

## 2023-06-02 RX ORDER — DOCUSATE SODIUM 100 MG/1
100 CAPSULE, LIQUID FILLED ORAL 2 TIMES DAILY
Status: DISCONTINUED | OUTPATIENT
Start: 2023-06-02 | End: 2023-06-03

## 2023-06-02 RX ORDER — SODIUM CHLORIDE 0.9 % (FLUSH) 0.9 %
5-40 SYRINGE (ML) INJECTION PRN
Status: DISCONTINUED | OUTPATIENT
Start: 2023-06-02 | End: 2023-06-03

## 2023-06-02 RX ORDER — LIDOCAINE HYDROCHLORIDE 10 MG/ML
INJECTION, SOLUTION INFILTRATION; PERINEURAL
Status: DISPENSED
Start: 2023-06-02 | End: 2023-06-02

## 2023-06-02 RX ORDER — SODIUM CHLORIDE 0.9 % (FLUSH) 0.9 %
5-40 SYRINGE (ML) INJECTION EVERY 12 HOURS SCHEDULED
Status: DISCONTINUED | OUTPATIENT
Start: 2023-06-02 | End: 2023-06-04 | Stop reason: HOSPADM

## 2023-06-02 RX ORDER — IBUPROFEN 800 MG/1
800 TABLET ORAL EVERY 8 HOURS PRN
Status: DISCONTINUED | OUTPATIENT
Start: 2023-06-02 | End: 2023-06-04 | Stop reason: HOSPADM

## 2023-06-02 RX ORDER — EPHEDRINE SULFATE/0.9% NACL/PF 50 MG/5 ML
5 SYRINGE (ML) INTRAVENOUS PRN
Status: DISCONTINUED | OUTPATIENT
Start: 2023-06-02 | End: 2023-06-03

## 2023-06-02 RX ORDER — DOCUSATE SODIUM 100 MG/1
100 CAPSULE, LIQUID FILLED ORAL 2 TIMES DAILY
Status: DISCONTINUED | OUTPATIENT
Start: 2023-06-02 | End: 2023-06-04 | Stop reason: HOSPADM

## 2023-06-02 RX ORDER — SODIUM CHLORIDE 0.9 % (FLUSH) 0.9 %
5-40 SYRINGE (ML) INJECTION PRN
Status: DISCONTINUED | OUTPATIENT
Start: 2023-06-02 | End: 2023-06-04 | Stop reason: HOSPADM

## 2023-06-02 RX ORDER — SODIUM CHLORIDE, SODIUM LACTATE, POTASSIUM CHLORIDE, AND CALCIUM CHLORIDE .6; .31; .03; .02 G/100ML; G/100ML; G/100ML; G/100ML
1000 INJECTION, SOLUTION INTRAVENOUS PRN
Status: DISCONTINUED | OUTPATIENT
Start: 2023-06-02 | End: 2023-06-03

## 2023-06-02 RX ORDER — SODIUM CHLORIDE 9 MG/ML
25 INJECTION, SOLUTION INTRAVENOUS PRN
Status: DISCONTINUED | OUTPATIENT
Start: 2023-06-02 | End: 2023-06-03

## 2023-06-02 RX ORDER — SODIUM CHLORIDE, SODIUM LACTATE, POTASSIUM CHLORIDE, AND CALCIUM CHLORIDE .6; .31; .03; .02 G/100ML; G/100ML; G/100ML; G/100ML
500 INJECTION, SOLUTION INTRAVENOUS PRN
Status: DISCONTINUED | OUTPATIENT
Start: 2023-06-02 | End: 2023-06-03

## 2023-06-02 RX ADMIN — SODIUM CHLORIDE, POTASSIUM CHLORIDE, SODIUM LACTATE AND CALCIUM CHLORIDE 125 ML/HR: 600; 310; 30; 20 INJECTION, SOLUTION INTRAVENOUS at 08:01

## 2023-06-02 RX ADMIN — Medication 2 MILLI-UNITS/MIN: at 08:04

## 2023-06-02 RX ADMIN — Medication 15 ML/HR: at 15:23

## 2023-06-02 RX ADMIN — SODIUM CHLORIDE, POTASSIUM CHLORIDE, SODIUM LACTATE AND CALCIUM CHLORIDE 1000 ML: 600; 310; 30; 20 INJECTION, SOLUTION INTRAVENOUS at 14:33

## 2023-06-02 RX ADMIN — Medication 5 ML: at 15:18

## 2023-06-02 RX ADMIN — SODIUM CHLORIDE, POTASSIUM CHLORIDE, SODIUM LACTATE AND CALCIUM CHLORIDE: 600; 310; 30; 20 INJECTION, SOLUTION INTRAVENOUS at 19:24

## 2023-06-02 ASSESSMENT — LIFESTYLE VARIABLES: SMOKING_STATUS: 0

## 2023-06-02 ASSESSMENT — PAIN DESCRIPTION - DESCRIPTORS
DESCRIPTORS: ACHING;CRAMPING

## 2023-06-02 NOTE — ANESTHESIA PROCEDURE NOTES
Epidural Block    Patient location during procedure: OB  Start time: 6/2/2023 2:48 PM  Reason for block: labor epidural  Staffing  Performed: resident/CRNA   Resident/CRNA: LIZ Cartwright - CRNA  Epidural  Patient position: sitting  Prep: Betadine  Patient monitoring: cardiac monitor, continuous pulse ox, capnometry and frequent blood pressure checks  Approach: midline  Location: L2-3  Injection technique: BINA air  Provider prep: sterile gloves and mask  Needle  Needle type: Tuohy   Needle gauge: 18 G  Needle length: 3.5 in  Needle insertion depth: 6.5 cm  Catheter type: end hole  Catheter size: 20 G  Catheter at skin depth: 6.5 cm  Test dose: negativeCatheter Secured: tegaderm  Assessment  Outcomes: uncomplicated  Additional Notes  Pt tolerated well.  See nurses flow sheet for VS. Pt in no distress   Preanesthetic Checklist  Completed: patient identified, IV checked, site marked, risks and benefits discussed, surgical/procedural consents, equipment checked, pre-op evaluation, timeout performed, anesthesia consent given, oxygen available, monitors applied/VS acknowledged, fire risk safety assessment completed and verbalized and blood product R/B/A discussed and consented

## 2023-06-02 NOTE — ANESTHESIA PRE PROCEDURE
Department of Anesthesiology  Preprocedure Note       Name:  Maria A Alvares   Age:  30 y.o.  :  1985                                          MRN:  [id number]         Date:  2023      Surgeon: MARIJA    Procedure: LABOR EPIDURAL    Medications prior to admission:   Prior to Admission medications    Medication Sig Start Date End Date Taking?  Authorizing Provider   Prenatal Vit-Fe Fumarate-FA (PRENATAL VITAMIN PO) Take 1 Dose by mouth daily   Yes Historical Provider, MD   ondansetron (ZOFRAN ODT) 4 MG disintegrating tablet Take 1 tablet by mouth every 8 hours as needed for Nausea or Vomiting  Patient not taking: Reported on 2023 1/10/22   Hetal Scott MD   Norgestim-Eth Estrad Triphasic 0.18/0.215/0.25 MG-25 MCG TABS Take 1 tablet by mouth nightly  Patient not taking: Reported on 2023    Historical Provider, MD   ibuprofen (ADVIL;MOTRIN) 200 MG tablet Take 200 mg by mouth every 6 hours as needed for Pain  Patient not taking: Reported on 2023    Historical Provider, MD       Current medications:    Current Facility-Administered Medications   Medication Dose Route Frequency Provider Last Rate Last Admin    lactated ringers IV soln infusion   IntraVENous Continuous Amine CORNELIUS Noble  mL/hr at 23 0801 125 mL/hr at 23 0801    lactated ringers bolus  500 mL IntraVENous PRN Amine CORNELIUS Noble MD        Or    lactated ringers bolus  1,000 mL IntraVENous PRN Amine CORNELIUS Noble .9 mL/hr at 23 1433 1,000 mL at 23 1433    sodium chloride flush 0.9 % injection 5-40 mL  5-40 mL IntraVENous 2 times per day Amine CORNELIUS Noble MD        sodium chloride flush 0.9 % injection 5-40 mL  5-40 mL IntraVENous PRN Wilmer Noble MD        0.9 % sodium chloride infusion  25 mL IntraVENous PRN Amine CORNELIUS Noble MD        methylergonovine (METHERGINE) injection 200 mcg  200 mcg IntraMUSCular PRN Amine CORNELIUS Noble MD        carboprost (HEMABATE) injection 250 mcg

## 2023-06-03 LAB
HCT VFR BLD AUTO: 33.6 % (ref 34–48)
HGB BLD-MCNC: 11 G/DL (ref 11.5–15.5)

## 2023-06-03 PROCEDURE — 6360000002 HC RX W HCPCS: Performed by: OBSTETRICS & GYNECOLOGY

## 2023-06-03 PROCEDURE — 90715 TDAP VACCINE 7 YRS/> IM: CPT | Performed by: OBSTETRICS & GYNECOLOGY

## 2023-06-03 PROCEDURE — 6370000000 HC RX 637 (ALT 250 FOR IP): Performed by: OBSTETRICS & GYNECOLOGY

## 2023-06-03 PROCEDURE — 90471 IMMUNIZATION ADMIN: CPT | Performed by: OBSTETRICS & GYNECOLOGY

## 2023-06-03 PROCEDURE — 85014 HEMATOCRIT: CPT

## 2023-06-03 PROCEDURE — 85018 HEMOGLOBIN: CPT

## 2023-06-03 PROCEDURE — 36415 COLL VENOUS BLD VENIPUNCTURE: CPT

## 2023-06-03 PROCEDURE — 1220000001 HC SEMI PRIVATE L&D R&B

## 2023-06-03 RX ADMIN — ACETAMINOPHEN 1000 MG: 500 TABLET ORAL at 07:45

## 2023-06-03 RX ADMIN — DOCUSATE SODIUM 100 MG: 100 CAPSULE, LIQUID FILLED ORAL at 21:06

## 2023-06-03 RX ADMIN — DOCUSATE SODIUM 100 MG: 100 CAPSULE, LIQUID FILLED ORAL at 07:45

## 2023-06-03 RX ADMIN — TETANUS TOXOID, REDUCED DIPHTHERIA TOXOID AND ACELLULAR PERTUSSIS VACCINE, ADSORBED 0.5 ML: 5; 2.5; 8; 8; 2.5 SUSPENSION INTRAMUSCULAR at 09:40

## 2023-06-03 ASSESSMENT — PAIN DESCRIPTION - LOCATION: LOCATION: ABDOMEN;BACK

## 2023-06-03 ASSESSMENT — PAIN - FUNCTIONAL ASSESSMENT: PAIN_FUNCTIONAL_ASSESSMENT: ACTIVITIES ARE NOT PREVENTED

## 2023-06-03 ASSESSMENT — PAIN DESCRIPTION - DESCRIPTORS: DESCRIPTORS: ACHING;CRAMPING;SORE

## 2023-06-03 ASSESSMENT — PAIN SCALES - GENERAL: PAINLEVEL_OUTOF10: 2

## 2023-06-03 NOTE — ANESTHESIA POSTPROCEDURE EVALUATION
Department of Anesthesiology  Postprocedure Note    Patient: Robin Kohli  MRN: [id number]  YOB: 1985  Date of evaluation: 6/3/2023      Procedure Summary     Date: 06/02/23 Room / Location:     Anesthesia Start: 1448 Anesthesia Stop: 2049    Procedure: Labor Analgesia Diagnosis:     Scheduled Providers:  Responsible Provider: Hugo Robison DO    Anesthesia Type: epidural ASA Status: 3          Anesthesia Type: No value filed.     Luc Phase I:      Luc Phase II:        Anesthesia Post Evaluation    Patient location during evaluation: floor  Patient participation: complete - patient participated  Level of consciousness: awake and alert  Airway patency: patent  Nausea & Vomiting: no nausea and no vomiting  Complications: no  Cardiovascular status: hemodynamically stable  Respiratory status: acceptable  Hydration status: euvolemic

## 2023-06-04 VITALS
OXYGEN SATURATION: 100 % | RESPIRATION RATE: 18 BRPM | HEART RATE: 84 BPM | TEMPERATURE: 97.8 F | SYSTOLIC BLOOD PRESSURE: 113 MMHG | DIASTOLIC BLOOD PRESSURE: 82 MMHG | BODY MASS INDEX: 27.6 KG/M2 | HEIGHT: 62 IN | WEIGHT: 150 LBS

## 2023-06-04 RX ORDER — IBUPROFEN 800 MG/1
800 TABLET ORAL EVERY 8 HOURS PRN
Qty: 120 TABLET | Refills: 3 | Status: SHIPPED | OUTPATIENT
Start: 2023-06-04

## 2025-02-13 ENCOUNTER — HOSPITAL ENCOUNTER (EMERGENCY)
Age: 40
Discharge: HOME | End: 2025-02-13
Payer: COMMERCIAL

## 2025-02-13 VITALS
WEIGHT: 115 LBS | OXYGEN SATURATION: 97 % | DIASTOLIC BLOOD PRESSURE: 73 MMHG | SYSTOLIC BLOOD PRESSURE: 99 MMHG | RESPIRATION RATE: 18 BRPM | TEMPERATURE: 99.9 F | BODY MASS INDEX: 21.03 KG/M2 | HEART RATE: 71 BPM

## 2025-02-13 DIAGNOSIS — H10.31 ACUTE BACTERIAL CONJUNCTIVITIS OF RIGHT EYE: Primary | ICD-10-CM

## 2025-02-13 PROCEDURE — 99211 OFF/OP EST MAY X REQ PHY/QHP: CPT

## 2025-02-13 RX ORDER — ERYTHROMYCIN 5 MG/G
OINTMENT OPHTHALMIC
Qty: 3.5 G | Refills: 0 | Status: SHIPPED | OUTPATIENT
Start: 2025-02-13 | End: 2025-02-23

## 2025-02-13 ASSESSMENT — PAIN SCALES - GENERAL: PAINLEVEL_OUTOF10: 7

## 2025-02-13 ASSESSMENT — PAIN DESCRIPTION - LOCATION: LOCATION: EYE

## 2025-02-13 ASSESSMENT — PAIN DESCRIPTION - DESCRIPTORS: DESCRIPTORS: ACHING;DISCOMFORT;DULL

## 2025-02-13 ASSESSMENT — PAIN DESCRIPTION - ORIENTATION: ORIENTATION: RIGHT

## 2025-02-13 ASSESSMENT — PAIN - FUNCTIONAL ASSESSMENT: PAIN_FUNCTIONAL_ASSESSMENT: 0-10

## 2025-02-13 NOTE — ED PROVIDER NOTES
medications:  Medications   fluorescein ophthalmic strip 1 mg (has no administration in time range)          PROCEDURES   Right eye anesthetized with tetracaine solution.  Right eye upper and lower eyelids examined and showed no evidence of foreign body under the eyelid.  Right eye examined under Woods lamp with the use of fluorescein stain.  No corneal ulcers or abrasions are identified.  Patient tolerated procedure well.  Right eye irrigated with sterile Eye-Stream solution.    REASSESSMENT   2/13/25       Time: 1050  Patient’s condition is improving after treatment and has improved and is mildly symptomatic .    CONSULTS:  None    DIFFERENTIAL DX_MDM   MDM:   Social Determinants: None    Records Reviewed: Source, electronic medical record    CC/HPI Summary, DDx, ED Course, Impression: Patient presents with Eye Problem (Onset yesterday right eye irritation, swelling, watering & pain)   Carrie Doll is a 39 y.o. female who presents to the ED with complaints of redness and irritation to the right eye with watering and mild eyelid swelling that developed yesterday.  Patient states she does not wear contacts.  Patient states she does wear glasses.  Patient states no changes in vision other than some blurriness.  Patient states the right eye is painful and itchy.  Patient states redness to the right eye.  Patient states no changes or redness to the left eye.  Patient denies fever or chills.  Patient denies recent injury to the right eye.  Patient states she did call her ophthalmologist who is out of the office today.    Impression is bacterial conjunctivitis of the right eye.  Plan is to discharge patient to home and have her follow-up with ophthalmology this week.  Patient will be prescribed erythromycin eye ointment to apply to the right eye 4 times daily for 1 week.  Patient is to wash hands frequently.  Patient is to take Motrin or Tylenol over-the-counter as needed for pain and/or fever.  Patient is to